# Patient Record
Sex: MALE | Race: BLACK OR AFRICAN AMERICAN | Employment: PART TIME | URBAN - METROPOLITAN AREA
[De-identification: names, ages, dates, MRNs, and addresses within clinical notes are randomized per-mention and may not be internally consistent; named-entity substitution may affect disease eponyms.]

---

## 2019-04-07 ENCOUNTER — OFFICE VISIT (OUTPATIENT)
Dept: URGENT CARE | Facility: URGENT CARE | Age: 24
End: 2019-04-07
Payer: OTHER MISCELLANEOUS

## 2019-04-07 ENCOUNTER — ANCILLARY PROCEDURE (OUTPATIENT)
Dept: GENERAL RADIOLOGY | Facility: CLINIC | Age: 24
End: 2019-04-07
Attending: FAMILY MEDICINE
Payer: OTHER MISCELLANEOUS

## 2019-04-07 VITALS
OXYGEN SATURATION: 98 % | TEMPERATURE: 97.2 F | HEART RATE: 72 BPM | DIASTOLIC BLOOD PRESSURE: 69 MMHG | RESPIRATION RATE: 18 BRPM | SYSTOLIC BLOOD PRESSURE: 124 MMHG

## 2019-04-07 DIAGNOSIS — M79.672 LEFT FOOT PAIN: ICD-10-CM

## 2019-04-07 DIAGNOSIS — Z02.6 ENCOUNTER RELATED TO WORKER'S COMPENSATION CLAIM: Primary | ICD-10-CM

## 2019-04-07 DIAGNOSIS — S90.32XA CONTUSION OF LEFT FOOT, INITIAL ENCOUNTER: ICD-10-CM

## 2019-04-07 PROCEDURE — 99213 OFFICE O/P EST LOW 20 MIN: CPT | Performed by: FAMILY MEDICINE

## 2019-04-07 PROCEDURE — 73630 X-RAY EXAM OF FOOT: CPT | Mod: LT

## 2019-04-07 RX ORDER — NAPROXEN 500 MG/1
500 TABLET ORAL 2 TIMES DAILY WITH MEALS
Qty: 60 TABLET | Refills: 0 | Status: SHIPPED | OUTPATIENT
Start: 2019-04-07

## 2019-04-07 RX ORDER — EMTRICITABINE AND TENOFOVIR DISOPROXIL FUMARATE 200; 300 MG/1; MG/1
1 TABLET, FILM COATED ORAL
COMMUNITY
Start: 2019-02-05 | End: 2019-05-06

## 2019-04-07 NOTE — LETTER
April 7, 2019      Rosas Crockett  [address]    [facility] [place] [address]        To Whom It May Concern:    Rosas Crockett  was seen on 04/7/2019.  Please excuse him  until 04/12/2019, due to injury.  Return to work on 04/12/2019, with the following restriction :  Standing and walking as tolerated,  May use sitting position  Follow up in one week time.      Sincerely,        Trini Wheatley MD

## 2019-04-07 NOTE — PATIENT INSTRUCTIONS
Patient Education     Foot Contusion  You have a contusion. This is also called a bruise. There is swelling and some bleeding under the skin, but no broken bones. This injury generally takes a few days to a few weeks to heal.  During that time, the bruise will typically change in color from reddish, to purple-blue, to greenish-yellow, then to yellow-brown.  Home care    Elevate the foot to reduce pain and swelling. As much as possible, sit or lie down with the foot raised about the level of your heart. This is especially important during the first 48 hours.    Ice the foot to help reduce pain and swelling. Wrap a cold source (ice pack or ice cubes in a plastic bag) in a thin towel. Apply to the bruised area for 20 minutes every 1 to 2 hours the first day. Continue this 3 to 4 times a day until the pain and swelling goes away.    Unless another medicine was prescribed, you can take acetaminophen, ibuprofen, or naproxen to control pain. (If you have chronic liver or kidney disease or ever had a stomach ulcer or gastrointestinal bleeding, talk with your healthcare provider before using these medicines.)  Follow up  Follow up with your healthcare provider or our staff as advised. Call if you are not improving within 1 to 2 weeks.  When to seek medical advice   Call your healthcare provider right away if you have any of the following:    Increased pain or swelling    Foot or leg becomes cold, blue, numb or tingly    Signs of infection: Warmth, drainage, or increased redness or pain around the bruise    Inability to move the injured foot     Frequent bruising for unknown reasons  Date Last Reviewed: 2/1/2017 2000-2018 [employer]. [address] [place] [address], [place] [address]. All rights reserved. This information is not intended as a substitute for professional medical care. Always follow your healthcare professional's instructions.

## 2019-04-07 NOTE — PROGRESS NOTES
SUBJECTIVE:  Rosas Crockett is a 23 year old male who sustained a left foot injury, at work, he work for [employer], he report was trying to move bags, and a pile of bags ( over 5 bags ) fell over his left foot, top of his foot, today,  days ago. Mechanism of injury: pian and swelling, top of foot, mild brusining. Immediate symptoms: immediate pain. Symptoms have been gradual since that time. Prior history of related problems: no prior problems with this area in the past.    OBJECTIVE:  Vital signs as noted above.  Appearance: well developed and well nourished, alert and cooperative.  Foot/ankle exam: Left foot exam:  Mild bruising at top foot, mid   soft tissue swelling and tenderness at the top of foot, ecchymoses at top of foot, tenderness and swelling of medial malleolus.    X-ray: soft tissue swelling. No acute finding    ASSESSMENT:  (Z02.6) Encounter related to worker's compensation claim  (primary encounter diagnosis)    Plan: naproxen (NAPROSYN) 500 MG tablet      (S90.32XA) Contusion of left foot, initial encounter    (M79.672) Left foot pain  Plan: XR Foot Left G/E 3 Views, naproxen (NAPROSYN)         500 MG tablet        PLAN:  NSAID, ice suggested  Was fitted with a Camp walker ( short )  Given a note to go back to work on 04/12/2019, with restrictions  activity modification  see primary care physician in follow up  Follow up in one wk  See orders in [facility].

## 2019-04-15 ENCOUNTER — OFFICE VISIT (OUTPATIENT)
Dept: FAMILY MEDICINE | Facility: CLINIC | Age: 24
End: 2019-04-15
Payer: OTHER MISCELLANEOUS

## 2019-04-15 VITALS
WEIGHT: 148.2 LBS | TEMPERATURE: 98.2 F | RESPIRATION RATE: 95 BRPM | DIASTOLIC BLOOD PRESSURE: 80 MMHG | HEART RATE: 77 BPM | SYSTOLIC BLOOD PRESSURE: 119 MMHG

## 2019-04-15 DIAGNOSIS — Z02.6 ENCOUNTER RELATED TO WORKER'S COMPENSATION CLAIM: ICD-10-CM

## 2019-04-15 DIAGNOSIS — S99.922A FOOT INJURY, LEFT, INITIAL ENCOUNTER: Primary | ICD-10-CM

## 2019-04-15 PROBLEM — Z20.6 EXPOSURE TO HIV: Status: ACTIVE | Noted: 2018-05-15

## 2019-04-15 PROCEDURE — 99213 OFFICE O/P EST LOW 20 MIN: CPT | Performed by: NURSE PRACTITIONER

## 2019-04-15 NOTE — LETTER
April 15, 2019      Rosas Crockett  [address]    [facility] [place] [address]        To Whom It May Concern:    Rosas Crockett was seen in our clinic 4/15/2019 as a follow up on a workman's compensation claim.  He sustained a contusion to the left foot on 4/7/2019.  Based on my exam today, 4/15/2019, he has now recovered and he may return to work without restrictions.      Sincerely,        HEIDI Keller CNP

## 2019-04-15 NOTE — PROGRESS NOTES
SUBJECTIVE:   Rosas Crockett is a 23 year old male who presents to clinic today for the following   health issues:        ED/UC Followup:    Facility:  [facility]  Date of visit: 4/7/19  Reason for visit: foot injury  Current Status: feeling a lot better       Musculoskeletal problem/pain      Duration: 4/7/19    Description  Location: left foot; dropped luggage on foot on 4/7/19 and was seen in urgent care the same day    Intensity:  mild    Accompanying signs and symptoms: none    History  Previous similar problem: no   Previous evaluation:  x-ray of left foot- negative for fracture    Precipitating or alleviating factors:  Trauma or overuse: no   Aggravating factors include: none    Therapies tried and outcome: naproxen  Patient followed instructions from urgent care provider.  Wore CAM walking boot while ambulating, iced 4 times a day, elevated foot when sitting, and took naproxen twice a day.  States foot feels normal.  Swelling has resolved and he is able to walk without pain.  He would like to be released to go back to work without restrictions.    Additional history: as documented    Reviewed  and updated as needed this visit by clinical staff  Tobacco  Allergies  Meds  Med Hx  Surg Hx  Fam Hx  Soc Hx        Reviewed and updated as needed this visit by Provider  Tobacco  Allergies  Meds  Med Hx  Surg Hx  Fam Hx  Soc Hx        Patient Active Problem List   Diagnosis     Exposure to HIV     History reviewed. No pertinent surgical history.    Social History     Tobacco Use     Smoking status: Never Smoker     Smokeless tobacco: Never Used   Substance Use Topics     Alcohol use: Not on file     History reviewed. No pertinent family history.      Current Outpatient Medications   Medication Sig Dispense Refill     emtricitabine-tenofovir (TRUVADA) 200-300 MG per tablet Take 1 tablet by mouth       naproxen (NAPROSYN) 500 MG tablet Take 1 tablet (500 mg) by mouth 2 times  daily (with meals) 60 tablet 0     No Known Allergies  BP Readings from Last 3 Encounters:   04/15/19 119/80   04/07/19 124/69    Wt Readings from Last 3 Encounters:   04/15/19 67.2 kg (148 lb 3.2 oz)                    ROS:  Constitutional, HEENT, cardiovascular, pulmonary, gi and gu systems are negative, except as otherwise noted.    OBJECTIVE:     /80   Pulse 77   Temp 98.2  F (36.8  C) (Oral)   Resp (!) 95   Wt 67.2 kg (148 lb 3.2 oz)   There is no height or weight on file to calculate BMI.  GENERAL: healthy, alert and no distress  MS: LLE exam shows normal strength and muscle mass, no deformities, no erythema, induration, or nodules, no evidence of joint effusion, ROM of all joints is normal and no evidence of joint instability  SKIN: no suspicious lesions or rashes    Diagnostic Test Results:  none     ASSESSMENT/PLAN:       ICD-10-CM    1. Foot injury, left, initial encounter S99.922A    2. Encounter related to worker's compensation claim Z02.6      Normal exam.  No longer with any pain.  Advised can discontinue boot and and wear supportive shoes.  Cleared to return to work without restrictions.  Letter written- one copy given to patient and one copy placed in TC basket to fax to Aneta.     HEIDI Keller [facility]

## 2019-04-15 NOTE — PROGRESS NOTES
Faxed signed letter to adeline Yin: Du, [phone], right fax confirmed at 3:46 pm today, 4/15/19. Letter to TC copy basket.  Adwoa Jett MA/  For [employer]

## 2019-04-15 NOTE — Clinical Note
Printed letter and placed in TC basket for workman's comp.  Please fax to Annamaria Sandy at  [phone].Thanks!Shaylee

## 2019-04-15 NOTE — PROGRESS NOTES
Faxed signed letter to adeline Yin: Du, [phone], right fax confirmed at 3:46 pm today, 4/15/19. Letter to TC copy basket.  Adwoa Jett MA/  For [employer]

## 2019-04-15 NOTE — PATIENT INSTRUCTIONS
.nani    At [facility], we strive to deliver an exceptional experience to you, every time we see you.  If you receive a survey in the mail, please send us back your thoughts. We really do value your feedback.    Your care team:                            Family Medicine Internal Medicine   MD Jh Barrientos MD Shantel Branch-Fleming, MD Katya Georgiev PA-C Megan Hill, APRORTIZ Velazquez, MD Pediatrics   ROXY Lockhart, MD Josefina Alonzo APRN CNP   MD Aminah Garcia MD Deborah Mielke, MD Kim Thein, APRN [facility]      Clinic hours: Monday - Thursday 7 am-7 pm; Fridays 7 am-5 pm.   Urgent care: Monday - Friday 11 am-9 pm; Saturday and Sunday 9 am-5 pm.  Pharmacy : Monday -Thursday 8 am-8 pm; Friday 8 am-6 pm; Saturday and Sunday 9 am-5 pm.     Clinic: [phone]   Pharmacy: [phone]

## 2019-04-23 ENCOUNTER — OFFICE VISIT (OUTPATIENT)
Dept: FAMILY MEDICINE | Facility: CLINIC | Age: 24
End: 2019-04-23
Payer: COMMERCIAL

## 2019-04-23 VITALS
BODY MASS INDEX: 22.19 KG/M2 | HEART RATE: 54 BPM | SYSTOLIC BLOOD PRESSURE: 123 MMHG | WEIGHT: 146.4 LBS | RESPIRATION RATE: 17 BRPM | HEIGHT: 68 IN | OXYGEN SATURATION: 96 % | TEMPERATURE: 97.8 F | DIASTOLIC BLOOD PRESSURE: 80 MMHG

## 2019-04-23 DIAGNOSIS — Z23 NEED FOR PROPHYLACTIC VACCINATION WITH TETANUS-DIPHTHERIA (TD): ICD-10-CM

## 2019-04-23 DIAGNOSIS — Z11.3 SCREENING EXAMINATION FOR VENEREAL DISEASE: ICD-10-CM

## 2019-04-23 DIAGNOSIS — R07.0 THROAT PAIN: Primary | ICD-10-CM

## 2019-04-23 LAB
DEPRECATED S PYO AG THROAT QL EIA: NORMAL
SPECIMEN SOURCE: NORMAL

## 2019-04-23 PROCEDURE — 87491 CHLMYD TRACH DNA AMP PROBE: CPT | Mod: 59 | Performed by: NURSE PRACTITIONER

## 2019-04-23 PROCEDURE — 90715 TDAP VACCINE 7 YRS/> IM: CPT | Performed by: NURSE PRACTITIONER

## 2019-04-23 PROCEDURE — 87081 CULTURE SCREEN ONLY: CPT | Performed by: NURSE PRACTITIONER

## 2019-04-23 PROCEDURE — 90471 IMMUNIZATION ADMIN: CPT | Performed by: NURSE PRACTITIONER

## 2019-04-23 PROCEDURE — 87880 STREP A ASSAY W/OPTIC: CPT | Performed by: NURSE PRACTITIONER

## 2019-04-23 PROCEDURE — 87591 N.GONORRHOEAE DNA AMP PROB: CPT | Performed by: NURSE PRACTITIONER

## 2019-04-23 PROCEDURE — 99213 OFFICE O/P EST LOW 20 MIN: CPT | Mod: 25 | Performed by: NURSE PRACTITIONER

## 2019-04-23 RX ORDER — LORATADINE 10 MG/1
10 TABLET ORAL DAILY
Qty: 30 TABLET | Refills: 0 | Status: SHIPPED | OUTPATIENT
Start: 2019-04-23

## 2019-04-23 ASSESSMENT — PAIN SCALES - GENERAL: PAINLEVEL: NO PAIN (0)

## 2019-04-23 ASSESSMENT — MIFFLIN-ST. JEOR: SCORE: 1632.54

## 2019-04-23 NOTE — PATIENT INSTRUCTIONS
For your symptoms:  -start Claritin daily for the next week or so   -please drink a lot of fluid- water is best- 8-10 glasses a day.   -rest as much as possible  -Gargle with warm salt water a few times a day to relieve a sore throat  Throat sprays or lozenges may also help relieve the pain  -Use saline (salt water) nose drops to help loosen mucus and moisten the tender skin in your nose  Stop smoking and avoid secondhand smoke and avoid alcohol. .  -Can use Mucinex (gauifenesin) 600-1200 mg twice a day for congestion (non-stimulating)  -Sudafed (pseudophedrine) can be purchased with your I.D. From the pharmacy without a prescription.  It is stimulating so try to avoid taking it at night or you may have trouble sleeping  -You can take Benadryl 12.5-50 mg(diphenhydramine) at night if symptoms are severe.  Will also help with sleep.  It will make you very drowsy, however, so make sure you have at least 8 hours to sleep.  -Saline spray can be helpful as well to clear your nasal passages of irritating allergens  -Ibuprofen or Tylenol for fever/body aches  -I recommend avoiding nasal spray like Afrin as this can cause severe rebound congestion

## 2019-04-23 NOTE — NURSING NOTE
Screening Questionnaire for Adult Immunization    Are you sick today?   No   Do you have allergies to medications, food, a vaccine component or latex?   No   Have you ever had a serious reaction after receiving a vaccination?   No   Do you have a long-term health problem with heart disease, lung disease, asthma, kidney disease, metabolic disease (e.g. diabetes), anemia, or other blood disorder?   No   Do you have cancer, leukemia, HIV/AIDS, or any other immune system problem?   No   In the past 3 months, have you taken medications that affect  your immune system, such as prednisone, other steroids, or anticancer drugs; drugs for the treatment of rheumatoid arthritis, Crohn s disease, or psoriasis; or have you had radiation treatments?   No   Have you had a seizure, or a brain or other nervous system problem?   No   During the past year, have you received a transfusion of blood or blood     products, or been given immune (gamma) globulin or antiviral drug?   No   For women: Are you pregnant or is there a chance you could become        pregnant during the next month?   No   Have you received any vaccinations in the past 4 weeks?   No     Immunization questionnaire answers were all negative.        Per orders of Shaylee Tierney, injection of TDAP given by Rashid Klein. Patient instructed to remain in clinic for 15 minutes afterwards, and to report any adverse reaction to me immediately.       Screening performed by Rashid Klein on 4/23/2019 at 9:13 AM.

## 2019-04-23 NOTE — PROGRESS NOTES
SUBJECTIVE:   Rosas Crockett is a 23 year old male who presents to clinic today for the following   health issues:        Acute Illness   Acute illness concerns: Strep   Onset: x2 days    Fever: no    Chills/Sweats: no    Headache (location?): no    Sinus Pressure:no    Conjunctivitis:  no    Ear Pain: no    Rhinorrhea: no    Congestion: no    Sore Throat: YES- White spots seen, but went away     Cough: YES, occasionally, nonproductive    Wheeze: no    Decreased Appetite: no    Nausea: no    Vomiting: no    Diarrhea:  no    Dysuria/Freq.: no    Fatigue/Achiness: no    Sick/Strep Exposure: YES- Possibility from coworker     Therapies Tried and outcome: Nothing    Also states had unprotected oral sex a few days ago.  Had penetrative anal intercourse as well but did use condoms for that.  He is on PreP and gets regular HIV and syphilis testing.     Additional history: as documented    Reviewed  and updated as needed this visit by clinical staff  Tobacco  Allergies  Meds  Med Hx  Surg Hx  Fam Hx  Soc Hx        Reviewed and updated as needed this visit by Provider         Patient Active Problem List   Diagnosis     Exposure to HIV     History reviewed. No pertinent surgical history.    Social History     Tobacco Use     Smoking status: Never Smoker     Smokeless tobacco: Never Used   Substance Use Topics     Alcohol use: Not on file     History reviewed. No pertinent family history.      Current Outpatient Medications   Medication Sig Dispense Refill     emtricitabine-tenofovir (TRUVADA) 200-300 MG per tablet Take 1 tablet by mouth       loratadine (CLARITIN) 10 MG tablet Take 1 tablet (10 mg) by mouth daily 30 tablet 0     naproxen (NAPROSYN) 500 MG tablet Take 1 tablet (500 mg) by mouth 2 times daily (with meals) 60 tablet 0     No Known Allergies  BP Readings from Last 3 Encounters:   04/23/19 123/80   04/15/19 119/80   04/07/19 124/69    Wt Readings from Last 3 Encounters:   04/23/19 66.4 kg (146 lb 6.4  "oz)   04/15/19 67.2 kg (148 lb 3.2 oz)                    ROS:  Constitutional, HEENT, cardiovascular, pulmonary, gi and gu systems are negative, except as otherwise noted.    OBJECTIVE:     /80 (BP Location: Right arm, Patient Position: Sitting, Cuff Size: Adult Regular)   Pulse 54   Temp 97.8  F (36.6  C) (Oral)   Resp 17   Ht 1.734 m (5' 8.25\")   Wt 66.4 kg (146 lb 6.4 oz)   SpO2 96%   BMI 22.10 kg/m    Body mass index is 22.1 kg/m .  GENERAL: healthy, alert and no distress  EYES: Eyes grossly normal to inspection, PERRL and conjunctivae and sclerae normal  HENT: normal cephalic/atraumatic, ear canals and TM's normal, nose and mouth without ulcers or lesions, nasal mucosa edematous , rhinorrhea clear, oropharynx clear, oral mucous membranes moist and tonsillar erythema  NECK: no adenopathy, no asymmetry, masses, or scars and thyroid normal to palpation  RESP: lungs clear to auscultation - no rales, rhonchi or wheezes  CV: regular rate and rhythm, normal S1 S2, no S3 or S4, no murmur, click or rub, no peripheral edema and peripheral pulses strong  ABDOMEN: soft, nontender, no hepatosplenomegaly, no masses and bowel sounds normal  MS: no gross musculoskeletal defects noted, no edema    Diagnostic Test Results:  Results for orders placed or performed in visit on 04/23/19 (from the past 24 hour(s))   Strep, Rapid Screen   Result Value Ref Range    Specimen Description Throat     Rapid Strep A Screen       NEGATIVE: No Group A streptococcal antigen detected by immunoassay, await culture report.       ASSESSMENT/PLAN:     1. Throat pain  Likely related to post nasal drip.  Trial of below.    - Strep, Rapid Screen  - Beta strep group A culture  - loratadine (CLARITIN) 10 MG tablet; Take 1 tablet (10 mg) by mouth daily  Dispense: 30 tablet; Refill: 0    2. Screening examination for venereal disease  Throat and urine samples collected.   - NEISSERIA GONORRHOEA PCR  - CHLAMYDIA TRACHOMATIS PCR  - NEISSERIA " GONORRHOEA PCR  - CHLAMYDIA TRACHOMATIS PCR    3. Need for prophylactic vaccination with tetanus-diphtheria (Td)  - TDAP, IM (10 - 64 YRS) - Adacel    Patient Instructions   For your symptoms:  -start Claritin daily for the next week or so   -please drink a lot of fluid- water is best- 8-10 glasses a day.   -rest as much as possible  -Gargle with warm salt water a few times a day to relieve a sore throat  Throat sprays or lozenges may also help relieve the pain  -Use saline (salt water) nose drops to help loosen mucus and moisten the tender skin in your nose  Stop smoking and avoid secondhand smoke and avoid alcohol. .  -Can use Mucinex (gauifenesin) 600-1200 mg twice a day for congestion (non-stimulating)  -Sudafed (pseudophedrine) can be purchased with your I.D. From the pharmacy without a prescription.  It is stimulating so try to avoid taking it at night or you may have trouble sleeping  -You can take Benadryl 12.5-50 mg(diphenhydramine) at night if symptoms are severe.  Will also help with sleep.  It will make you very drowsy, however, so make sure you have at least 8 hours to sleep.  -Saline spray can be helpful as well to clear your nasal passages of irritating allergens  -Ibuprofen or Tylenol for fever/body aches  -I recommend avoiding nasal spray like Afrin as this can cause severe rebound congestion            HEIDI Keller [facility]

## 2019-04-24 LAB
BACTERIA SPEC CULT: NORMAL
C TRACH DNA SPEC QL NAA+PROBE: NEGATIVE
C TRACH DNA SPEC QL NAA+PROBE: NEGATIVE
N GONORRHOEA DNA SPEC QL NAA+PROBE: NEGATIVE
N GONORRHOEA DNA SPEC QL NAA+PROBE: NEGATIVE
SPECIMEN SOURCE: NORMAL